# Patient Record
Sex: FEMALE | Race: BLACK OR AFRICAN AMERICAN
[De-identification: names, ages, dates, MRNs, and addresses within clinical notes are randomized per-mention and may not be internally consistent; named-entity substitution may affect disease eponyms.]

---

## 2019-11-28 ENCOUNTER — HOSPITAL ENCOUNTER (EMERGENCY)
Dept: HOSPITAL 74 - JERFT | Age: 39
Discharge: HOME | End: 2019-11-28
Payer: COMMERCIAL

## 2019-11-28 VITALS — TEMPERATURE: 98.5 F | DIASTOLIC BLOOD PRESSURE: 79 MMHG | HEART RATE: 86 BPM | SYSTOLIC BLOOD PRESSURE: 150 MMHG

## 2019-11-28 VITALS — BODY MASS INDEX: 28.7 KG/M2

## 2019-11-28 DIAGNOSIS — M54.6: Primary | ICD-10-CM

## 2019-11-28 DIAGNOSIS — Z88.2: ICD-10-CM

## 2019-11-28 DIAGNOSIS — Z98.84: ICD-10-CM

## 2019-11-28 DIAGNOSIS — I10: ICD-10-CM

## 2019-11-28 PROCEDURE — 3E0233Z INTRODUCTION OF ANTI-INFLAMMATORY INTO MUSCLE, PERCUTANEOUS APPROACH: ICD-10-PCS

## 2019-11-28 NOTE — PDOC
History of Present Illness





- General


Chief Complaint: Back Pain


Stated Complaint: UPPER BACK PAIN


Time Seen by Provider: 11/28/19 12:28


History Source: Patient





- History of Present Illness


Occurred: reports: other


Pain Location: reports: back





Past History





- Past Medical History


Allergies/Adverse Reactions: 


 Allergies











Allergy/AdvReac Type Severity Reaction Status Date / Time


 


Sulfa (Sulfonamide Allergy   Verified 11/28/19 12:29





Antibiotics)     











Home Medications: 


Ambulatory Orders





Acetaminophen [Tylenol -] 1,000 mg PO Q6H #30 tablet 11/28/19 


Cyclobenzaprine HCl [Flexeril 10 mg] 10 mg PO HS #9 tablet 11/28/19 


Tramadol HCl 50 mg PO Q6H #15 tablet MDD 200mg 11/28/19 








COPD: No


HTN: Yes





- Surgical History


Abdominal Surgery: Yes (gastric sleeve 4/2018)





- Psycho Social/Smoking Cessation Hx


Smoking History: Never smoked


Hx Alcohol Use: No


Drug/Substance Use Hx: No





**Review of Systems





- Review of Systems


Constitutional: No: Chills, Fever


Respiratory: No: Shortness of Breath


Cardiac (ROS): No: Chest Pain


ABD/GI: No: Nausea, Vomiting, Abdominal cramping


Musculoskeletal: Yes: Back Pain


Neurological: No: Numbness, Tingling, Weakness





*Physical Exam





- Vital Signs


 Last Vital Signs











Temp Pulse Resp BP Pulse Ox


 


 98.5 F   86   18   150/79   100 


 


 11/28/19 12:26  11/28/19 12:26  11/28/19 12:26  11/28/19 12:26  11/28/19 12:26














- Physical Exam


General Appearance: Yes: Appropriately Dressed, Mild Distress


HEENT: positive: Normal Voice


Neck: positive: Supple


Respiratory/Chest: positive: Lungs Clear, Normal Breath Sounds.  negative: 

Respiratory Distress


Cardiovascular: positive: Regular Rate, S1, S2


Gastrointestinal/Abdominal: positive: Soft.  negative: Tender


Musculoskeletal: positive: Vertebral Tenderness (to mid upper back and over R 

shoulder blade).  negative: CVA Tenderness


Integumentary: positive: Dry, Warm


Neurologic: positive: Fully Oriented, Alert, Normal Mood/Affect, Motor Strength 

5/5





Medical Decision Making





- Medical Decision Making





11/28/19 12:52


38-year-old female, s/p weight loss surgery remotely, s/p tubal ligation, here 

with back pain.  Patient states for the past several days have had mid upper 

back pain radiating to right upper back and to right chest, hurts to touch and 

with movement.  Taking Tylenol with no relief.  No trauma or other obvious 

inciting factors.  No history of similar pain.  Denies chest pain shortness of 

breath diaphoresis nausea vomiting.  No  symptoms fever or chills





see exam





M/l MSK back pain


No red flags at this time


-dc w/ pain control


-PMD f/u as needed


11/28/19 12:55








Discharge





- Discharge Information


Problems reviewed: Yes


Clinical Impression/Diagnosis: 


Back pain


Qualifiers:


 Back pain location: thoracic back pain Chronicity: unspecified Back pain 

laterality: right Qualified Code(s): M54.6 - Pain in thoracic spine





Condition: Good


Disposition: HOME





- Additional Discharge Information


Prescriptions: 


Acetaminophen [Tylenol -] 1,000 mg PO Q6H #30 tablet


Cyclobenzaprine HCl [Flexeril 10 mg] 10 mg PO HS #9 tablet


Tramadol HCl 50 mg PO Q6H #15 tablet MDD 200mg





- Follow up/Referral





- Patient Discharge Instructions


Patient Printed Discharge Instructions:  Thoracic Back Pain


Additional Instructions: 


Take medication as directed.  If your pain continues, please follow-up with 

your PMD





- Post Discharge Activity

## 2022-12-21 ENCOUNTER — HOSPITAL ENCOUNTER (EMERGENCY)
Dept: HOSPITAL 74 - JER | Age: 42
Discharge: HOME | End: 2022-12-21
Payer: COMMERCIAL

## 2022-12-21 VITALS — RESPIRATION RATE: 18 BRPM | TEMPERATURE: 97.8 F

## 2022-12-21 VITALS — DIASTOLIC BLOOD PRESSURE: 99 MMHG | HEART RATE: 79 BPM | SYSTOLIC BLOOD PRESSURE: 134 MMHG

## 2022-12-21 VITALS — BODY MASS INDEX: 29.2 KG/M2

## 2022-12-21 DIAGNOSIS — T78.40XA: Primary | ICD-10-CM

## 2022-12-21 PROCEDURE — 3E023GC INTRODUCTION OF OTHER THERAPEUTIC SUBSTANCE INTO MUSCLE, PERCUTANEOUS APPROACH: ICD-10-PCS
